# Patient Record
Sex: FEMALE | Race: BLACK OR AFRICAN AMERICAN | NOT HISPANIC OR LATINO | Employment: UNEMPLOYED | RURAL
[De-identification: names, ages, dates, MRNs, and addresses within clinical notes are randomized per-mention and may not be internally consistent; named-entity substitution may affect disease eponyms.]

---

## 2024-11-18 ENCOUNTER — HOSPITAL ENCOUNTER (EMERGENCY)
Facility: HOSPITAL | Age: 19
Discharge: HOME | End: 2024-11-18
Payer: MEDICAID

## 2024-11-18 VITALS
RESPIRATION RATE: 18 BRPM | DIASTOLIC BLOOD PRESSURE: 65 MMHG | HEART RATE: 106 BPM | HEIGHT: 63 IN | BODY MASS INDEX: 38.98 KG/M2 | SYSTOLIC BLOOD PRESSURE: 112 MMHG | OXYGEN SATURATION: 99 % | TEMPERATURE: 99 F | WEIGHT: 220 LBS

## 2024-11-18 DIAGNOSIS — J02.0 STREP PHARYNGITIS: Primary | ICD-10-CM

## 2024-11-18 LAB
GROUP A STREP MOLECULAR (OHS): NEGATIVE
INFLUENZA A MOLECULAR (OHS): NEGATIVE
INFLUENZA B MOLECULAR (OHS): NEGATIVE
SARS-COV-2 RDRP RESP QL NAA+PROBE: NEGATIVE

## 2024-11-18 PROCEDURE — 99284 EMERGENCY DEPT VISIT MOD MDM: CPT | Mod: ,,,

## 2024-11-18 PROCEDURE — 63600175 PHARM REV CODE 636 W HCPCS

## 2024-11-18 PROCEDURE — 25000003 PHARM REV CODE 250

## 2024-11-18 PROCEDURE — 96372 THER/PROPH/DIAG INJ SC/IM: CPT

## 2024-11-18 PROCEDURE — 87502 INFLUENZA DNA AMP PROBE: CPT

## 2024-11-18 PROCEDURE — 87651 STREP A DNA AMP PROBE: CPT

## 2024-11-18 PROCEDURE — 99284 EMERGENCY DEPT VISIT MOD MDM: CPT | Mod: 25

## 2024-11-18 PROCEDURE — 87635 SARS-COV-2 COVID-19 AMP PRB: CPT

## 2024-11-18 RX ORDER — ACETAMINOPHEN 500 MG
1000 TABLET ORAL
Status: COMPLETED | OUTPATIENT
Start: 2024-11-18 | End: 2024-11-18

## 2024-11-18 RX ORDER — METHYLPREDNISOLONE ACETATE 40 MG/ML
40 INJECTION, SUSPENSION INTRA-ARTICULAR; INTRALESIONAL; INTRAMUSCULAR; SOFT TISSUE
Status: COMPLETED | OUTPATIENT
Start: 2024-11-18 | End: 2024-11-18

## 2024-11-18 RX ORDER — IBUPROFEN 200 MG
600 TABLET ORAL
Status: COMPLETED | OUTPATIENT
Start: 2024-11-18 | End: 2024-11-18

## 2024-11-18 RX ORDER — LIDOCAINE HYDROCHLORIDE 10 MG/ML
1 INJECTION, SOLUTION INFILTRATION; PERINEURAL ONCE
Status: COMPLETED | OUTPATIENT
Start: 2024-11-18 | End: 2024-11-18

## 2024-11-18 RX ORDER — LISDEXAMFETAMINE DIMESYLATE 70 MG/1
70 CAPSULE ORAL EVERY MORNING
COMMUNITY

## 2024-11-18 RX ORDER — CETIRIZINE HYDROCHLORIDE 10 MG/1
10 TABLET ORAL DAILY
COMMUNITY

## 2024-11-18 RX ORDER — DEXAMETHASONE SODIUM PHOSPHATE 4 MG/ML
4 INJECTION, SOLUTION INTRA-ARTICULAR; INTRALESIONAL; INTRAMUSCULAR; INTRAVENOUS; SOFT TISSUE
Status: COMPLETED | OUTPATIENT
Start: 2024-11-18 | End: 2024-11-18

## 2024-11-18 RX ORDER — CEFTRIAXONE 1 G/1
1 INJECTION, POWDER, FOR SOLUTION INTRAMUSCULAR; INTRAVENOUS ONCE
Status: COMPLETED | OUTPATIENT
Start: 2024-11-18 | End: 2024-11-18

## 2024-11-18 RX ORDER — AMOXICILLIN 500 MG/1
500 TABLET, FILM COATED ORAL EVERY 12 HOURS
Qty: 20 TABLET | Refills: 0 | Status: SHIPPED | OUTPATIENT
Start: 2024-11-18 | End: 2024-11-21 | Stop reason: ALTCHOICE

## 2024-11-18 RX ADMIN — IBUPROFEN 600 MG: 200 TABLET, FILM COATED ORAL at 07:11

## 2024-11-18 RX ADMIN — METHYLPREDNISOLONE ACETATE 40 MG: 40 INJECTION, SUSPENSION INTRA-ARTICULAR; INTRALESIONAL; INTRAMUSCULAR; SOFT TISSUE at 08:11

## 2024-11-18 RX ADMIN — DEXAMETHASONE SODIUM PHOSPHATE 4 MG: 4 INJECTION, SOLUTION INTRA-ARTICULAR; INTRALESIONAL; INTRAMUSCULAR; INTRAVENOUS; SOFT TISSUE at 08:11

## 2024-11-18 RX ADMIN — CEFTRIAXONE SODIUM 1 G: 1 INJECTION, POWDER, FOR SOLUTION INTRAMUSCULAR; INTRAVENOUS at 08:11

## 2024-11-18 RX ADMIN — ACETAMINOPHEN 1000 MG: 500 TABLET ORAL at 07:11

## 2024-11-18 RX ADMIN — LIDOCAINE HYDROCHLORIDE 2.1 ML: 10 INJECTION, SOLUTION INFILTRATION; PERINEURAL at 08:11

## 2024-11-18 NOTE — Clinical Note
"Christopher"Clint Peters was seen and treated in our emergency department on 11/18/2024.  She may return to school on 11/20/2024.      If you have any questions or concerns, please don't hesitate to call.      Sarah Marshall, NP"

## 2024-11-19 NOTE — ED PROVIDER NOTES
Encounter Date: 11/18/2024       History     Chief Complaint   Patient presents with    Sore Throat    Generalized Body Aches    Chills     Onset of symptoms yesterday afternoon     19 year old  female presents to ED complaining of sore throat, chills, and body aches x 2 days.  Last took Motrin at 1200 today.  She states the pain is worse when swallowing. She has tender cervical lymph nodes with swollen erythematous tonsils. She is able to speak in clear and complete sentences with no respiratory distress with oxygen saturation room air 99% or better. She's states she has not taken her temperature but that she felt hot with chills. She attends college at [employer]. PMH includes ADHD and seasonal allergies. She has a birth control implant and states menstrual cycles are irregular.    The history is provided by the patient.     Review of patient's allergies indicates:  No Known Allergies  Past Medical History:   Diagnosis Date    ADHD     Unspecified chronic bronchitis      History reviewed. No pertinent surgical history.  Family History   Problem Relation Name Age of Onset    Hypertension Mother      Hyperlipidemia Father       Social History     Tobacco Use    Smoking status: Never     Passive exposure: Current    Smokeless tobacco: Never   Substance Use Topics    Alcohol use: Yes     Comment: socially    Drug use: Not Currently     Review of Systems   Constitutional:  Positive for chills. Negative for fever.   HENT:  Positive for sore throat. Negative for congestion, ear pain and rhinorrhea.    Eyes:  Negative for visual disturbance.   Respiratory:  Negative for cough and shortness of breath.    Cardiovascular:  Negative for chest pain, palpitations and leg swelling.   Gastrointestinal:  Negative for abdominal pain, constipation, diarrhea, nausea and vomiting.   Genitourinary:  Negative for dysuria, flank pain, frequency and hematuria.   Musculoskeletal:  Positive for myalgias. Negative for neck pain and  neck stiffness.   Skin:  Negative for rash and wound.   Neurological:  Negative for weakness and headaches.   Psychiatric/Behavioral:  Negative for suicidal ideas.        Physical Exam     Initial Vitals [11/18/24 1926]   BP Pulse Resp Temp SpO2   (!) 130/91 (!) 128 (!) 21 (!) 101.6 °F (38.7 °C) 99 %      MAP       --         Physical Exam    Nursing note and vitals reviewed.  Constitutional: She appears well-developed and well-nourished. No distress.   HENT:   Head: Normocephalic.   Right Ear: External ear normal.   Left Ear: External ear normal. Mouth/Throat: No oropharyngeal exudate.   Red erythematous oropharynx and tonsils   Eyes: Conjunctivae and EOM are normal. Pupils are equal, round, and reactive to light. No scleral icterus.   Neck:   Normal range of motion.  Cardiovascular:  Regular rhythm, normal heart sounds and intact distal pulses.           No murmur heard.  Pulmonary/Chest: Breath sounds normal. No respiratory distress. She has no wheezes. She has no rales.   Abdominal: Abdomen is soft. Bowel sounds are normal. She exhibits no distension. There is no abdominal tenderness. There is no rebound.   Musculoskeletal:         General: No edema. Normal range of motion.      Cervical back: Normal range of motion.     Lymphadenopathy:     She has cervical adenopathy.   Neurological: She is alert and oriented to person, place, and time. She has normal strength. GCS score is 15. GCS eye subscore is 4. GCS verbal subscore is 5. GCS motor subscore is 6.   Skin: Skin is warm and dry. Capillary refill takes less than 2 seconds.   Psychiatric: She has a normal mood and affect.         Medical Screening Exam   See Full Note    ED Course   Procedures  Labs Reviewed   INFLUENZA A & B BY MOLECULAR - Normal       Result Value    INFLUENZA A MOLECULAR Negative      INFLUENZA B MOLECULAR  Negative     SARS-COV-2 RNA AMPLIFICATION, QUAL - Normal    SARS COV-2 Molecular Negative      Narrative:     Negative SARS-CoV results  should not be used as the sole basis for treatment or patient management decisions; negative results should be considered in the context of a patient's recent exposures, history and the presene of clinical signs and symptoms consistent with COVID-19.  Negative results should be treated as presumptive and confirmed by molecular assay, if necessary for patient management.   STREP A BY MOLECULAR METHOD - Normal    Group A Strep Molecular Negative            Imaging Results    None          Medications   acetaminophen tablet 1,000 mg (1,000 mg Oral Given 11/18/24 1940)   ibuprofen tablet 600 mg (600 mg Oral Given 11/18/24 1940)   dexAMETHasone injection 4 mg (4 mg Intramuscular Given 11/18/24 2045)   methylPREDNISolone acetate injection 40 mg (40 mg Intramuscular Given 11/18/24 2045)   cefTRIAXone injection 1 g (1 g Intramuscular Given 11/18/24 2042)   LIDOcaine HCL 10 mg/ml (1%) injection 1 mL (2.1 mLs Intramuscular Given 11/18/24 2043)     Medical Decision Making  The presentation of Christopher Peters is consistent with streptococcal pharyngitis based on sufficient Centor Criteria and/or positive rapid diagnostic testing. Centor score 4. The presentation of Christopher Peters is NOT consistent with airway compromising conditions such as but not limited to retropharyngeal abscess, peritonsillar abscess, or epiglottitis.    Upon discharge, Christopher Peters has no evidence of respiratory failure and is comfortable without respiratory distress. Additionally, Christopher Peters has no evidence of airway compromise and is speaking in full/complete sentences without difficulty. Christopher Peters was managing are respiratory/airway secretions at time of discharge. Christopher Peters meets outpatient treatment criteria.    Data Reviewed/Counseling: I have reviewed the patient's vital signs, nursing notes, and other relevant ancillary testing/information. I have had a detailed discussion with the patient regarding the  historical points, examination findings, and any diagnostic results. I have also discussed the need for outpatient follow-up. Antibiotics were prescribed in accordance with Centor Criteria and/or positive diagnostic testing.    Christopher Peters has been counseled to return to the Emergency Department if symptoms worsen in particular if they develop difficulty breathing, difficulty swallowing, uncontrolled pain, airway compromise, or if there are any questions or concerns that arise while at home.         Amount and/or Complexity of Data Reviewed  Independent Historian:      Details: 19 year old  female presents to ED complaining of sore throat, chills, and body aches x 2 days.  Last took Motrin at 1200 today.  She states the pain is worse when swallowing. She has tender cervical lymph nodes with swollen erythematous tonsils. She is able to speak in clear and complete sentences with no respiratory distress with oxygen saturation room air 99% or better. She's states she has not taken her temperature but that she felt hot with chills. She attends college at [employer]. PMH includes ADHD and seasonal allergies. She has a birth control implant and states menstrual cycles are irregular.  Labs: ordered.     Details: Covid/flu/strep swabs all negative    Risk  OTC drugs.  Prescription drug management.                                      Clinical Impression:   Final diagnoses:  [J02.0] Strep pharyngitis (Primary)        ED Disposition Condition    Discharge           ED Prescriptions       Medication Sig Dispense Start Date End Date Auth. Provider    amoxicillin (AMOXIL) 500 MG Tab Take 1 tablet (500 mg total) by mouth every 12 (twelve) hours. for 10 days 20 tablet 11/18/2024 11/28/2024 Sarah Marshall NP          Follow-up Information    None          Sarah Marshall NP  11/18/24 7427

## 2024-11-19 NOTE — DISCHARGE INSTRUCTIONS
Follow up with primary care provider in the next 2-3 days for re-evaluation.   Complete full course of antibiotics.  Throw away toothbrush and get new one 24 hours after starting antibiotics.   Do not share food or drink with anyone.  Salt water gargle throughout the day.   Throat spray to relieve soreness.   Increase fluids.  Cool fluids throughout the day.   Motrin 600 mg every 8 hours as needed for pain/discomfort.   Tylenol 650 mg every 4 hours as needed for pain/discomfort.    Return to emergency department for any new or worsening symptoms.

## 2024-11-20 ENCOUNTER — HOSPITAL ENCOUNTER (EMERGENCY)
Facility: HOSPITAL | Age: 19
Discharge: HOME | End: 2024-11-21
Payer: MEDICAID

## 2024-11-20 ENCOUNTER — TELEPHONE (OUTPATIENT)
Dept: EMERGENCY MEDICINE | Facility: HOSPITAL | Age: 19
End: 2024-11-20
Payer: MEDICAID

## 2024-11-20 DIAGNOSIS — J03.90 TONSILLITIS WITH EXUDATE: Primary | ICD-10-CM

## 2024-11-20 DIAGNOSIS — R05.9 COUGH IN ADULT: ICD-10-CM

## 2024-11-20 PROCEDURE — 99284 EMERGENCY DEPT VISIT MOD MDM: CPT | Mod: ,,,

## 2024-11-20 PROCEDURE — 25000003 PHARM REV CODE 250

## 2024-11-20 PROCEDURE — 36415 COLL VENOUS BLD VENIPUNCTURE: CPT

## 2024-11-20 PROCEDURE — 99284 EMERGENCY DEPT VISIT MOD MDM: CPT | Mod: 25

## 2024-11-20 PROCEDURE — 80053 COMPREHEN METABOLIC PANEL: CPT

## 2024-11-20 PROCEDURE — 63600175 PHARM REV CODE 636 W HCPCS

## 2024-11-20 PROCEDURE — 87651 STREP A DNA AMP PROBE: CPT

## 2024-11-20 PROCEDURE — 85025 COMPLETE CBC W/AUTO DIFF WBC: CPT

## 2024-11-20 PROCEDURE — 87635 SARS-COV-2 COVID-19 AMP PRB: CPT

## 2024-11-20 PROCEDURE — 96374 THER/PROPH/DIAG INJ IV PUSH: CPT

## 2024-11-20 PROCEDURE — 87502 INFLUENZA DNA AMP PROBE: CPT

## 2024-11-20 RX ORDER — ACETAMINOPHEN 500 MG
1000 TABLET ORAL
Status: COMPLETED | OUTPATIENT
Start: 2024-11-20 | End: 2024-11-20

## 2024-11-20 RX ORDER — IBUPROFEN 200 MG
600 TABLET ORAL
Status: COMPLETED | OUTPATIENT
Start: 2024-11-20 | End: 2024-11-20

## 2024-11-20 RX ORDER — DEXAMETHASONE SODIUM PHOSPHATE 4 MG/ML
4 INJECTION, SOLUTION INTRA-ARTICULAR; INTRALESIONAL; INTRAMUSCULAR; INTRAVENOUS; SOFT TISSUE
Status: COMPLETED | OUTPATIENT
Start: 2024-11-20 | End: 2024-11-20

## 2024-11-20 RX ADMIN — IBUPROFEN 600 MG: 200 TABLET, FILM COATED ORAL at 11:11

## 2024-11-20 RX ADMIN — DEXAMETHASONE SODIUM PHOSPHATE 4 MG: 4 INJECTION, SOLUTION INTRA-ARTICULAR; INTRALESIONAL; INTRAMUSCULAR; INTRAVENOUS; SOFT TISSUE at 11:11

## 2024-11-20 RX ADMIN — ACETAMINOPHEN 1000 MG: 500 TABLET ORAL at 11:11

## 2024-11-21 ENCOUNTER — TELEPHONE (OUTPATIENT)
Dept: EMERGENCY MEDICINE | Facility: HOSPITAL | Age: 19
End: 2024-11-21
Payer: MEDICAID

## 2024-11-21 VITALS
OXYGEN SATURATION: 99 % | BODY MASS INDEX: 38.98 KG/M2 | HEIGHT: 63 IN | DIASTOLIC BLOOD PRESSURE: 78 MMHG | WEIGHT: 220 LBS | SYSTOLIC BLOOD PRESSURE: 120 MMHG | RESPIRATION RATE: 20 BRPM | TEMPERATURE: 100 F | HEART RATE: 103 BPM

## 2024-11-21 LAB
ALBUMIN SERPL BCP-MCNC: 3.8 G/DL (ref 3.5–5)
ALBUMIN/GLOB SERPL: 0.8 {RATIO}
ALP SERPL-CCNC: 59 U/L (ref 40–150)
ALT SERPL W P-5'-P-CCNC: 25 U/L
ANION GAP SERPL CALCULATED.3IONS-SCNC: 16 MMOL/L (ref 7–16)
AST SERPL W P-5'-P-CCNC: 42 U/L (ref 5–34)
BASOPHILS # BLD AUTO: 0.02 K/UL (ref 0–0.2)
BASOPHILS NFR BLD AUTO: 0.2 % (ref 0–1)
BILIRUB SERPL-MCNC: 0.9 MG/DL
BUN SERPL-MCNC: 8 MG/DL (ref 7–19)
BUN/CREAT SERPL: 9 (ref 6–20)
CALCIUM SERPL-MCNC: 9.6 MG/DL (ref 8.4–10.2)
CHLORIDE SERPL-SCNC: 102 MMOL/L (ref 98–107)
CO2 SERPL-SCNC: 22 MMOL/L (ref 22–29)
CREAT SERPL-MCNC: 0.86 MG/DL (ref 0.55–1.02)
DIFFERENTIAL METHOD BLD: ABNORMAL
EGFR (NO RACE VARIABLE) (RUSH/TITUS): 99 ML/MIN/1.73M2
EOSINOPHIL # BLD AUTO: 0.01 K/UL (ref 0–0.5)
EOSINOPHIL NFR BLD AUTO: 0.1 % (ref 1–4)
ERYTHROCYTE [DISTWIDTH] IN BLOOD BY AUTOMATED COUNT: 13.4 % (ref 11.5–14.5)
GLOBULIN SER-MCNC: 4.6 G/DL (ref 2–4)
GLUCOSE SERPL-MCNC: 105 MG/DL (ref 74–100)
GROUP A STREP MOLECULAR (OHS): NEGATIVE
HCT VFR BLD AUTO: 40.2 % (ref 38–47)
HETEROPH AB SER QL LA: NEGATIVE
HGB BLD-MCNC: 13.2 G/DL (ref 12–16)
INFLUENZA A MOLECULAR (OHS): NEGATIVE
INFLUENZA B MOLECULAR (OHS): NEGATIVE
LYMPHOCYTES # BLD AUTO: 1.78 K/UL (ref 1–4.8)
LYMPHOCYTES NFR BLD AUTO: 20.5 % (ref 27–41)
LYMPHOCYTES NFR BLD MANUAL: 25 % (ref 27–41)
MCH RBC QN AUTO: 28.7 PG (ref 27–31)
MCHC RBC AUTO-ENTMCNC: 32.8 G/DL (ref 32–36)
MCV RBC AUTO: 87.4 FL (ref 80–96)
MONOCYTES # BLD AUTO: 1.88 K/UL (ref 0–0.8)
MONOCYTES NFR BLD AUTO: 21.7 % (ref 2–6)
MONOCYTES NFR BLD MANUAL: 14 % (ref 2–6)
MPC BLD CALC-MCNC: 11.3 FL (ref 9.4–12.4)
NEUTROPHILS # BLD AUTO: 4.99 K/UL (ref 1.8–7.7)
NEUTROPHILS NFR BLD AUTO: 57.5 % (ref 53–65)
NEUTS SEG NFR BLD MANUAL: 61 % (ref 50–62)
NRBC BLD MANUAL-RTO: ABNORMAL %
PLATELET # BLD AUTO: 282 K/UL (ref 150–400)
PLATELET MORPHOLOGY: NORMAL
POTASSIUM SERPL-SCNC: 3.5 MMOL/L (ref 3.5–5.1)
PROT SERPL-MCNC: 8.4 G/DL (ref 6.4–8.3)
RBC # BLD AUTO: 4.6 M/UL (ref 4.2–5.4)
RBC MORPH BLD: NORMAL
SARS-COV-2 RDRP RESP QL NAA+PROBE: NEGATIVE
SODIUM SERPL-SCNC: 136 MMOL/L (ref 136–145)
WBC # BLD AUTO: 8.68 K/UL (ref 4.5–11)

## 2024-11-21 PROCEDURE — 86308 HETEROPHILE ANTIBODY SCREEN: CPT

## 2024-11-21 PROCEDURE — 96375 TX/PRO/DX INJ NEW DRUG ADDON: CPT

## 2024-11-21 PROCEDURE — 36415 COLL VENOUS BLD VENIPUNCTURE: CPT

## 2024-11-21 PROCEDURE — 63600175 PHARM REV CODE 636 W HCPCS

## 2024-11-21 RX ORDER — AMOXICILLIN AND CLAVULANATE POTASSIUM 875; 125 MG/1; MG/1
1 TABLET, FILM COATED ORAL EVERY 12 HOURS
Qty: 20 TABLET | Refills: 0 | Status: SHIPPED | OUTPATIENT
Start: 2024-11-21 | End: 2024-12-01

## 2024-11-21 RX ORDER — ONDANSETRON HYDROCHLORIDE 2 MG/ML
4 INJECTION, SOLUTION INTRAVENOUS
Status: COMPLETED | OUTPATIENT
Start: 2024-11-21 | End: 2024-11-21

## 2024-11-21 RX ORDER — CEFTRIAXONE 1 G/1
1 INJECTION, POWDER, FOR SOLUTION INTRAMUSCULAR; INTRAVENOUS
Status: COMPLETED | OUTPATIENT
Start: 2024-11-21 | End: 2024-11-21

## 2024-11-21 RX ADMIN — ONDANSETRON 4 MG: 2 INJECTION INTRAMUSCULAR; INTRAVENOUS at 01:11

## 2024-11-21 RX ADMIN — CEFTRIAXONE 1 G: 1 INJECTION, POWDER, FOR SOLUTION INTRAMUSCULAR; INTRAVENOUS at 01:11

## 2024-11-21 NOTE — DISCHARGE INSTRUCTIONS
Follow up with primary care provider in the next 1-2 days for re-evaluation.   Complete full course of antibiotics.  Throw away toothbrush and get new one 24 hours after starting antibiotics.   Do not share food or drink with anyone.  Salt water gargle throughout the day.   Throat spray to relieve soreness.   Cool fluids throughout the day.   Motrin 600 mg every 8 hours as needed for pain/discomfort/fever.  Tylenol 650 mg every 4 hours as needed for pain/discomfort/fever.  Return to emergency department for any new or worsening symptoms.

## 2024-11-21 NOTE — ED PROVIDER NOTES
Encounter Date: 11/20/2024       History     Chief Complaint   Patient presents with    Sore Throat     Sore throat, hoarseness and subjective fever not improved from prior ED visit. Taking motrin(12pm)/tylenol(yesterday) and nyquil for symptoms w/out relief.      19-year-old  female presents to the ED complaining of sore throat with extremely painful swallowing, fever, and cough with thick yellow sputum x 3-4 days.  She was seen here on 11/18/24 and given steroid injection, Rocephin IM, tylenol and motrin with prescription for amoxicillin for suspected strep pharyngitis with Center score of 4 with similar symptoms as those of today.  She states she was not able to  her amoxicillin prescription and feels worse.  She took Motrin today at 12:00 p.m. and last took Tylenol yesterday with NyQuil with minimal to no relief.  She has red swollen tonsils with white exudate, cervical adenopathy, and a dry tongue.  PMH includes ADH and seasonal allergies.       The history is provided by the patient.     Review of patient's allergies indicates:  No Known Allergies  Past Medical History:   Diagnosis Date    ADHD     Unspecified chronic bronchitis      History reviewed. No pertinent surgical history.  Family History   Problem Relation Name Age of Onset    Hypertension Mother      Hyperlipidemia Father       Social History     Tobacco Use    Smoking status: Never     Passive exposure: Current    Smokeless tobacco: Never   Substance Use Topics    Alcohol use: Yes     Comment: socially    Drug use: Not Currently     Review of Systems   Constitutional:  Negative for chills and fever.   HENT:  Positive for congestion, rhinorrhea and sore throat.    Eyes:  Negative for visual disturbance.   Respiratory:  Positive for cough. Negative for shortness of breath.    Cardiovascular:  Negative for chest pain and palpitations.   Gastrointestinal:  Negative for abdominal pain, constipation, diarrhea, nausea and vomiting.    Genitourinary:  Negative for dysuria, flank pain, frequency and hematuria.   Musculoskeletal:  Negative for arthralgias, neck pain and neck stiffness.   Skin:  Negative for rash and wound.   Neurological:  Negative for weakness and headaches.   Psychiatric/Behavioral:  Negative for suicidal ideas.        Physical Exam     Initial Vitals [11/20/24 2311]   BP Pulse Resp Temp SpO2   120/78 (!) 131 20 99.9 °F (37.7 °C) 98 %      MAP       --         Physical Exam    Nursing note and vitals reviewed.  Constitutional: She appears well-developed and well-nourished.   Obese; ill-appearing   HENT:   Right Ear: External ear normal.   Left Ear: External ear normal. Mouth/Throat: Oropharyngeal exudate present.   Red swollen tonsils with white exudate, dry tongue   Eyes: Conjunctivae and EOM are normal. Pupils are equal, round, and reactive to light. No scleral icterus.   Neck: No tracheal deviation present.   Normal range of motion.  Cardiovascular:  Regular rhythm, normal heart sounds and intact distal pulses.           No murmur heard.  Sinus tachycardia   Pulmonary/Chest: Breath sounds normal. No stridor. No respiratory distress. She has. Wheezes: RUL.  Abdominal: Abdomen is soft. Bowel sounds are normal. She exhibits no distension. There is no abdominal tenderness. There is no rebound.   Musculoskeletal:         General: No edema. Normal range of motion.      Cervical back: Normal range of motion.     Lymphadenopathy:     She has cervical adenopathy.   Neurological: She is alert and oriented to person, place, and time. She has normal strength. GCS score is 15. GCS eye subscore is 4. GCS verbal subscore is 5. GCS motor subscore is 6.   Skin: Skin is warm and dry. Capillary refill takes less than 2 seconds.   Psychiatric: She has a normal mood and affect.         Medical Screening Exam   See Full Note    ED Course   Procedures  Labs Reviewed   COMPREHENSIVE METABOLIC PANEL - Abnormal       Result Value    Sodium 136       Potassium 3.5      Chloride 102      CO2 22      Anion Gap 16      Glucose 105 (*)     BUN 8      Creatinine 0.86      BUN/Creatinine Ratio 9      Calcium 9.6      Total Protein 8.4 (*)     Albumin 3.8      Globulin 4.6 (*)     A/G Ratio 0.8      Bilirubin, Total 0.9      Alk Phos 59      ALT 25      AST 42 (*)     eGFR 99     CBC WITH DIFFERENTIAL - Abnormal    WBC 8.68      RBC 4.60      Hemoglobin 13.2      Hematocrit 40.2      MCV 87.4      MCH 28.7      MCHC 32.8      RDW 13.4      Platelet Count 282      MPV 11.3      Neutrophils % 57.5      Lymphocytes % 20.5 (*)     Neutrophils, Abs 4.99      Lymphocytes, Absolute 1.78      Diff Type Manual      Monocytes % 21.7 (*)     Eosinophils % 0.1 (*)     Basophils % 0.2      Monocytes, Absolute 1.88 (*)     Eosinophils, Absolute 0.01      Basophils, Absolute 0.02     MANUAL DIFFERENTIAL - Abnormal    Segmented Neutrophils, Man % 61      Lymphocytes, Man % 25 (*)     Monocytes, Man % 14 (*)     nRBC, Manual        Platelet Morphology Normal      RBC Morphology Normal     INFLUENZA A & B BY MOLECULAR - Normal    INFLUENZA A MOLECULAR Negative      INFLUENZA B MOLECULAR  Negative     SARS-COV-2 RNA AMPLIFICATION, QUAL - Normal    SARS COV-2 Molecular Negative      Narrative:     Negative SARS-CoV results should not be used as the sole basis for treatment or patient management decisions; negative results should be considered in the context of a patient's recent exposures, history and the presene of clinical signs and symptoms consistent with COVID-19.  Negative results should be treated as presumptive and confirmed by molecular assay, if necessary for patient management.   STREP A BY MOLECULAR METHOD - Normal    Group A Strep Molecular Negative     HETEROPHILE AB SCREEN - Normal    Mono, Blood Negative     CBC W/ AUTO DIFFERENTIAL    Narrative:     The following orders were created for panel order CBC auto differential.  Procedure                                Abnormality         Status                     ---------                               -----------         ------                     CBC with Differential[[id number]]       Abnormal            Final result               Manual Differential[[id number]]         Abnormal            Final result                 Please view results for these tests on the individual orders.          Imaging Results              X-Ray Chest PA And Lateral (In process)                      Medications   ibuprofen tablet 600 mg (600 mg Oral Given 11/20/24 2347)   acetaminophen tablet 1,000 mg (1,000 mg Oral Given 11/20/24 2347)   dexAMETHasone injection 4 mg (4 mg Intravenous Given 11/20/24 2348)   cefTRIAXone injection 1 g (1 g Intravenous Given 11/21/24 0108)   ondansetron injection 4 mg (4 mg Intravenous Given 11/21/24 0115)     Medical Decision Making  The presentation of Christopher Peters is consistent with streptococcal pharyngitis based on sufficient Centor Criteria and/or positive rapid diagnostic testing. The presentation of Christopher Peters is NOT consistent with airway compromising conditions such as but not limited to retropharyngeal abscess, peritonsillar abscess, or epiglottitis.    Upon discharge, Christopher Peters has no evidence of respiratory failure and is comfortable without respiratory distress. Additionally, Christopher Peters has no evidence of airway compromise and is speaking in full/complete sentences without difficulty. Christopher Peters was managing are respiratory/airway secretions at time of discharge. Christopher Peters meets outpatient treatment criteria.    Data Reviewed/Counseling: I have reviewed the patient's vital signs, nursing notes, and other relevant ancillary testing/information. I have had a detailed discussion with the patient regarding the historical points, examination findings, and any diagnostic results. I have also discussed the need for outpatient follow-up. Antibiotics were prescribed  in accordance with Centor Criteria and/or positive diagnostic testing. She received Rocephin, Decadron, and Zofran IV in the ED. She has been prescribed Augmentin due to not taking her previously prescribed amoxicillin with worsening symptoms.    Christopher Peters has been counseled to return to the Emergency Department if symptoms worsen in particular if they develop difficulty breathing, difficulty swallowing, uncontrolled pain, airway compromise, or if there are any questions or concerns that arise while at home.         Amount and/or Complexity of Data Reviewed  Independent Historian:      Details: 19-year-old  female presents to the ED complaining of sore throat with extremely painful swallowing, fever, and cough with thick yellow sputum x 3-4 days.  She was seen here on 11/18/24 and given steroid injection, Rocephin IM, tylenol and motrin with prescription for amoxicillin for suspected strep pharyngitis with Center score of 4 with similar symptoms as those of today.  She states she was not able to  her amoxicillin prescription and feels worse.  She took Motrin today at 12:00 p.m. and last took Tylenol yesterday with NyQuil with minimal to no relief.  She has red swollen tonsils with white exudate, cervical adenopathy, and a dry tongue.  PMH includes ADH and seasonal allergies.     Labs: ordered.     Details: CMP-sodium 136, potassium 2.5, anion gap 16, BUN/creatinine 8/0.86, BUN/creatinine ratio 9, GFR 99, glucose 105  CBC- WBC 8.68, H&H 13.2/40.2, platelets 282  COVID/flu/strep swabs all negative Monospot negative  Radiology: ordered.     Details: Chest x-ray PA and lateral-no acute cardiopulmonary abnormality.  No focal consolidation, pleural effusion, or pneumothorax.    Risk  OTC drugs.  Prescription drug management.                          Medical Decision Making:   Differential Diagnosis:   Strep pharyngitis  COVID  Flu  Mononucleosis             Clinical Impression:   Final  diagnoses:  [R05.9] Cough in adult  [J03.90] Tonsillitis with exudate (Primary)        ED Disposition Condition    Discharge Stable          ED Prescriptions       Medication Sig Dispense Start Date End Date Auth. Provider    amoxicillin-clavulanate 875-125mg (AUGMENTIN) 875-125 mg per tablet Take 1 tablet by mouth every 12 (twelve) hours. for 10 days 20 tablet 11/21/2024 12/1/2024 Sarah Marshall NP          Follow-up Information    None          Sarah Marshall NP  11/21/24 0138